# Patient Record
Sex: MALE | Race: WHITE | NOT HISPANIC OR LATINO
[De-identification: names, ages, dates, MRNs, and addresses within clinical notes are randomized per-mention and may not be internally consistent; named-entity substitution may affect disease eponyms.]

---
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
## 2025-06-16 ENCOUNTER — NON-APPOINTMENT (OUTPATIENT)
Age: 20
End: 2025-06-16

## 2025-06-21 NOTE — ED PEDIATRIC NURSE NOTE - CAS EDP DISCH TYPE
Chief Complaint: Chest discomfort, excess belching    Interval Hx: Patient seen and examined earlier this AM. Patient reported feeling improved, essentially asymptomatic aside for some mild dyspnea on exertion. She remains on supplemental O2  2L/min via NC, SPO2 95%, may be able to wean. She was seen by Cardiology who advised continued gentle diuresis, likely discharge home tomorrow if she continues to improve.     ROS: Multi system review is comprehensively negative x 10 systems except as above    Vitals:  T(F): 98.8 (21 Jun 2025 16:34), Max: 98.8 (21 Jun 2025 16:34)  HR: 77 (21 Jun 2025 16:34) (67 - 80)  BP: 151/66 (21 Jun 2025 16:34) (118/58 - 179/67)  RR: 19 (21 Jun 2025 16:34) (17 - 19)  SpO2: 95% (21 Jun 2025 16:34) (90% - 100%) on 2L/min via NC    Exam:  Gen: Comfortable appearing  HEENT: NCAT PERRL EOMI MMM clear oropharynx  Neck: Supple, no JVD  CVS: s1 s2 normal, RRR  Chest: Normal resp effort at rest, slightly diminished breath sounds in bases B/L  Abd: Non distended, +BS, soft, non tender  Ext: No edema, no tenderness, intact peripheral pulses, normal cap refill  Skin: Warm, dry  Mood: Calm, pleasant  Neuro: A+Ox3, no deficits    Labs:                        10.4   6.37  )---------( 195                 34.9       140  |  105  |  14  ----------------------<  102  4.2   |   30   |  0.88    Ca    8.9      Phos  4.1    Mg     2.0       TPro  7.2  /  Alb  3.4  /  TBili  0.4  /  DBili  x   /  AST  12  /  ALT  11  /  AlkPhos  96      Troponin negative      LDL 51     A1c 5.6    TSH 5.16    FT4 requested     Imaging:  CT chest, abdomen and pelvis 6/20: Patent central airways. Interlobular septal thickening. Mild emphysema. A 0.5 cm cystic lesion with peripheral thickening, stable. Minimal groundglass opacities. Small bilateral pleural effusions. Aortic calcifications. Coronary artery calcifications. Heart size is normal. No pericardial effusion. Multiple shotty mediastinal lymph nodes. Normal chest wall. Mild nodular contour to the liver. Mild intra and extrahepatic biliary ductal dilatation without obstructive stone or lesion. Cholecystectomy. Spleen, pancreas and adrenals unremarkable. Kidneys with no hydronephrosis. Symmetric renal enhancement. Normal bladder. No bowel obstruction. Small hiatal hernia. Colonic diverticula. Normal appendix. No abdominopelvic lymphadenopathy. Right hip arthroplasty.    Cardiac Testing:  TTE 6/21: Left ventricular cavity is normal in size. Left ventricular wall thickness is normal. Left ventricular systolic function is normal with an ejection fraction visually estimated at 55 to 60 %. Normal left ventricular diastolic function. Left atrium is moderately dilated. Mild aortic regurg. Mild mitral regurgitation. Mild to moderate tricuspid regurgitation. Estimated pulmonary artery systolic pressure is 39 mmHg, consistent with mild pulmonary hypertension.     Tele 6/21: Personally reviewed. SR, 70s. Occ PVC.    EKG 6/20: Rate 79. Normal sinus rhythm    Meds:  MEDICATIONS  (STANDING):  aspirin enteric coated 81 milliGRAM(s) Oral daily  escitalopram 10 milliGRAM(s) Oral daily  fluticasone propionate/ salmeterol 250-50 MICROgram(s) Diskus 1 Dose(s) Inhalation two times a day  furosemide    Tablet 20 milliGRAM(s) Oral daily  heparin   Injectable 5000 Unit(s) SubCutaneous every 12 hours  pantoprazole    Tablet 40 milliGRAM(s) Oral before breakfast  rosuvastatin 20 milliGRAM(s) Oral at bedtime  ursodiol Tablet 500 milliGRAM(s) Oral two times a day    MEDICATIONS  (PRN):  acetaminophen     Tablet .. 650 milliGRAM(s) Oral once PRN Temp greater or equal to 38C (100.4F), Mild Pain (1 - 3)  albuterol    90 MICROgram(s) HFA Inhaler 2 Puff(s) Inhalation every 6 hours PRN Shortness of Breath and/or Wheezing  aluminum hydroxide/magnesium hydroxide/simethicone Suspension 30 milliLiter(s) Oral every 4 hours PRN Dyspepsia  melatonin 3 milliGRAM(s) Oral at bedtime PRN Insomnia  ondansetron Injectable 4 milliGRAM(s) IV Push once PRN Nausea and/or Vomiting                          
Home